# Patient Record
Sex: FEMALE | Race: WHITE | NOT HISPANIC OR LATINO | Employment: FULL TIME | URBAN - METROPOLITAN AREA
[De-identification: names, ages, dates, MRNs, and addresses within clinical notes are randomized per-mention and may not be internally consistent; named-entity substitution may affect disease eponyms.]

---

## 2017-01-09 ENCOUNTER — CONVERSION ENCOUNTER (OUTPATIENT)
Dept: MAMMOGRAPHY | Facility: HOSPITAL | Age: 57
End: 2017-01-09

## 2018-02-08 ENCOUNTER — CONVERSION ENCOUNTER (OUTPATIENT)
Dept: MAMMOGRAPHY | Facility: HOSPITAL | Age: 58
End: 2018-02-08

## 2018-03-12 ENCOUNTER — OFFICE VISIT CONVERTED (OUTPATIENT)
Dept: ORTHOPEDIC SURGERY | Facility: CLINIC | Age: 58
End: 2018-03-12
Attending: PHYSICIAN ASSISTANT

## 2018-03-12 ENCOUNTER — CONVERSION ENCOUNTER (OUTPATIENT)
Dept: ORTHOPEDIC SURGERY | Facility: CLINIC | Age: 58
End: 2018-03-12

## 2018-04-10 ENCOUNTER — OFFICE VISIT CONVERTED (OUTPATIENT)
Dept: ORTHOPEDIC SURGERY | Facility: CLINIC | Age: 58
End: 2018-04-10
Attending: PHYSICIAN ASSISTANT

## 2021-05-16 VITALS — HEIGHT: 67 IN | WEIGHT: 131 LBS | BODY MASS INDEX: 20.56 KG/M2 | RESPIRATION RATE: 18 BRPM

## 2021-05-16 VITALS — BODY MASS INDEX: 20.56 KG/M2 | WEIGHT: 131 LBS | HEIGHT: 67 IN | RESPIRATION RATE: 16 BRPM

## 2021-11-18 ENCOUNTER — APPOINTMENT (OUTPATIENT)
Dept: VACCINE CLINIC | Facility: HOSPITAL | Age: 61
End: 2021-11-18

## 2023-08-09 ENCOUNTER — TELEPHONE (OUTPATIENT)
Dept: ORTHOPEDIC SURGERY | Facility: CLINIC | Age: 63
End: 2023-08-09

## 2023-08-09 NOTE — TELEPHONE ENCOUNTER
Caller: ISAAC   Relationship to Patient: SELF  Phone Number: [phone]   Reason for Call: ATTEMPTED TO WARM TRANSFER,   PATIENT HAD PREVIOUS HIP REPLACEMENT AND HAD FOLLOW UP APPTS WITH JACLYN SANDY IN CHART. EVERYTHING STATES THAT THIS OFFICE IS PERMANENTLY CLOSED. 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

## 2023-08-09 NOTE — TELEPHONE ENCOUNTER
"PER SUPERVISORS REQUEST: ADDING RETURN CALL FROM PATIENT TO FORMER TE    PATIENT CALLING BACK (RAJ ROBLERO) AFTER DISCUSSION W/ OFFICE EARLIER TODAY. SHE STATES SHE CALLED HER PCP AND THEY SAID THEY CAN NOT COMPLETE THE FORM FOR HER & MUST BE THE SURGEON. SHE STATES THIS IS JUST A DIAGRAM OF A BODY AND \"x\" NEEDS TO BE PLACED ON THE DIAGRAM OF WHERE THE SX OCCURRED AND WHERE A FOREIGN BODY WAS PLACED (EX: PACEMAKER, METAL, ETC) AND WOULD PUT A CHECK RICARDO IN THE BOX STATING THAT THIS  WOULD INTERFERE WITH THE METAL DETECTORS. SHE DOESN'T KNOW HOW TO GET THIS COMPLETED BY [facility]. SHE DID NOT GET A CARD AT THE TIME OF SX STATING THIS. CAN SOMEONE CONTACT HER AND HELP HER GET THIS COMPLETED. SHE STATES SHE HAS BEEN ON THE PHONE ALL DAY TODAY TRYING TO GET THIS. 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      "

## 2023-08-10 NOTE — TELEPHONE ENCOUNTER
Patient had Right hip hemiarthroplasty for femoral neck fracture, [place]. Card is up front for patient to  DR Laurent 2/22/2018